# Patient Record
Sex: FEMALE | Race: ASIAN | NOT HISPANIC OR LATINO
[De-identification: names, ages, dates, MRNs, and addresses within clinical notes are randomized per-mention and may not be internally consistent; named-entity substitution may affect disease eponyms.]

---

## 2021-04-26 ENCOUNTER — APPOINTMENT (OUTPATIENT)
Dept: CARDIOLOGY | Facility: CLINIC | Age: 86
End: 2021-04-26
Payer: MEDICARE

## 2021-04-26 VITALS
WEIGHT: 138 LBS | RESPIRATION RATE: 17 BRPM | OXYGEN SATURATION: 97 % | BODY MASS INDEX: 24.45 KG/M2 | SYSTOLIC BLOOD PRESSURE: 151 MMHG | TEMPERATURE: 98.6 F | HEART RATE: 75 BPM | HEIGHT: 63 IN | DIASTOLIC BLOOD PRESSURE: 78 MMHG

## 2021-04-26 DIAGNOSIS — R55 SYNCOPE AND COLLAPSE: ICD-10-CM

## 2021-04-26 PROBLEM — Z00.00 ENCOUNTER FOR PREVENTIVE HEALTH EXAMINATION: Status: ACTIVE | Noted: 2021-04-26

## 2021-04-26 PROCEDURE — 99072 ADDL SUPL MATRL&STAF TM PHE: CPT

## 2021-04-26 PROCEDURE — 99204 OFFICE O/P NEW MOD 45 MIN: CPT

## 2021-04-26 PROCEDURE — 93306 TTE W/DOPPLER COMPLETE: CPT

## 2021-04-26 RX ORDER — NITROGLYCERIN 0.4 MG/1
0.4 TABLET SUBLINGUAL
Qty: 1 | Refills: 0 | Status: ACTIVE | COMMUNITY
Start: 2021-04-26 | End: 1900-01-01

## 2021-04-30 ENCOUNTER — NON-APPOINTMENT (OUTPATIENT)
Age: 86
End: 2021-04-30

## 2021-05-04 ENCOUNTER — APPOINTMENT (OUTPATIENT)
Dept: CARDIOLOGY | Facility: CLINIC | Age: 86
End: 2021-05-04
Payer: MEDICARE

## 2021-05-04 PROCEDURE — 99072 ADDL SUPL MATRL&STAF TM PHE: CPT

## 2021-05-04 PROCEDURE — A9500: CPT

## 2021-05-04 PROCEDURE — 78452 HT MUSCLE IMAGE SPECT MULT: CPT

## 2021-05-04 PROCEDURE — 93015 CV STRESS TEST SUPVJ I&R: CPT

## 2021-05-05 ENCOUNTER — NON-APPOINTMENT (OUTPATIENT)
Age: 86
End: 2021-05-05

## 2022-09-06 NOTE — HISTORY OF PRESENT ILLNESS
[FreeTextEntry1] : 86 year-old female with HTN presents for evaluation of CP.  Patient reports 2 episodes of  L sided CP that radiates back to L back, not tender, lasting minutes. Patient would feel weak with diaphoresis.  Patient reports SOB with exertion.  Patient denies palpitations.  She reports an episode of syncope last fall as she was just standing and talking.  She reports vertigo occasionally.  I advised patient to undergo an echocardiogram. I advised patient to wear a Holter monitor. I advised patient to undergo nuclear stress test.  Patient underwent an echocardiogram and it showed normal LV function without significant valvular pathology.  Patient underwent a pharmacologic nuclear stress test and it showed normal myocardial perfusion.  Patient wore a Holter and it showed frequent APC's (7.5 %), occasional PVC's , and short runs of PAT (10 beats the longest).  I will consider Metoprolol ER 25 mg if she becomes symptomatic.

## 2022-09-09 ENCOUNTER — APPOINTMENT (OUTPATIENT)
Dept: CARDIOLOGY | Facility: CLINIC | Age: 87
End: 2022-09-09
Payer: MEDICARE

## 2022-09-09 ENCOUNTER — NON-APPOINTMENT (OUTPATIENT)
Age: 87
End: 2022-09-09

## 2022-09-09 VITALS
RESPIRATION RATE: 17 BRPM | OXYGEN SATURATION: 98 % | TEMPERATURE: 97.8 F | HEART RATE: 64 BPM | DIASTOLIC BLOOD PRESSURE: 92 MMHG | WEIGHT: 133 LBS | SYSTOLIC BLOOD PRESSURE: 167 MMHG | BODY MASS INDEX: 23.56 KG/M2

## 2022-09-09 DIAGNOSIS — R07.9 CHEST PAIN, UNSPECIFIED: ICD-10-CM

## 2022-09-09 DIAGNOSIS — I10 ESSENTIAL (PRIMARY) HYPERTENSION: ICD-10-CM

## 2022-09-09 PROCEDURE — 99214 OFFICE O/P EST MOD 30 MIN: CPT | Mod: 25

## 2022-09-09 PROCEDURE — 93000 ELECTROCARDIOGRAM COMPLETE: CPT

## 2022-09-09 RX ORDER — ROPINIROLE 0.25 MG/1
0.25 TABLET, FILM COATED ORAL DAILY
Qty: 30 | Refills: 0 | Status: ACTIVE | COMMUNITY
Start: 2022-09-09

## 2022-09-09 RX ORDER — LISINOPRIL AND HYDROCHLOROTHIAZIDE TABLETS 10; 12.5 MG/1; MG/1
10-12.5 TABLET ORAL DAILY
Qty: 90 | Refills: 0 | Status: ACTIVE | COMMUNITY
Start: 2022-09-09

## 2022-09-09 RX ORDER — NIFEDIPINE 30 MG/1
30 TABLET, EXTENDED RELEASE ORAL DAILY
Qty: 30 | Refills: 0 | Status: ACTIVE | COMMUNITY
Start: 2022-09-09

## 2022-09-09 RX ORDER — METOPROLOL SUCCINATE 25 MG/1
25 TABLET, EXTENDED RELEASE ORAL
Qty: 60 | Refills: 1 | Status: ACTIVE | COMMUNITY
Start: 2022-09-09 | End: 1900-01-01

## 2022-09-12 PROBLEM — R07.9 CHEST PAIN: Status: ACTIVE | Noted: 2022-09-12

## 2022-09-23 ENCOUNTER — APPOINTMENT (OUTPATIENT)
Dept: CARDIOLOGY | Facility: CLINIC | Age: 87
End: 2022-09-23
Payer: MEDICARE

## 2022-09-23 VITALS — DIASTOLIC BLOOD PRESSURE: 78 MMHG | SYSTOLIC BLOOD PRESSURE: 132 MMHG | TEMPERATURE: 97.8 F

## 2022-09-23 DIAGNOSIS — R07.89 OTHER CHEST PAIN: ICD-10-CM

## 2022-09-23 PROCEDURE — A9500: CPT

## 2022-09-23 PROCEDURE — 78452 HT MUSCLE IMAGE SPECT MULT: CPT | Mod: 26

## 2022-09-23 PROCEDURE — 93306 TTE W/DOPPLER COMPLETE: CPT | Mod: 26

## 2022-09-23 NOTE — REASON FOR VISIT
[Symptom and Test Evaluation] : symptom and test evaluation [FreeTextEntry1] : 87 year-old female with HTN who presents for evaluation of chest pain.\par \par Last seen by Dr. Leggett 4/26/21:  I advised patient to undergo an echocardiogram. I advised patient to wear a Holter monitor. I advised patient to undergo nuclear stress test. Patient underwent an echocardiogram and it showed normal LV function without significant valvular pathology. Patient underwent a pharmacologic nuclear stress test and it showed normal myocardial perfusion. Patient wore a Holter and it showed frequent APC's (7.5 %), occasional PVC's , and short runs of PAT (10 beats the longest). I will consider Metoprolol ER 25 mg if she becomes symptomatic. \par \par She is on ASA 81 for cardioprotection. She is on Lisinopril-HCTZ 10-12.5, nifedipine 30 for HTN. She has been taking metoprolol succinate 25mg once daily.

## 2022-09-23 NOTE — REVIEW OF SYSTEMS
[Vertigo] : vertigo [SOB] : shortness of breath [Chest Discomfort] : chest discomfort [Palpitations] : palpitations [Negative] : Heme/Lymph [Lower Ext Edema] : no extremity edema [Leg Claudication] : no intermittent leg claudication [Orthopnea] : no orthopnea [PND] : no PND [Syncope] : no syncope

## 2022-09-23 NOTE — ASSESSMENT
[FreeTextEntry1] : 87 year-old female with HTN who presents for evaluation of chest pain.\par \par Last seen by Dr. Leggett 4/26/21: I advised patient to undergo an echocardiogram. I advised patient to wear a Holter monitor. I advised patient to undergo nuclear stress test. Patient underwent an echocardiogram and it showed normal LV function without significant valvular pathology. Patient underwent a pharmacologic nuclear stress test and it showed normal myocardial perfusion. Patient wore a Holter and it showed frequent APC's (7.5 %), occasional PVC's , and short runs of PAT (10 beats the longest). I will consider Metoprolol ER 25 mg if she becomes symptomatic. \par \par She is on ASA 81 for cardioprotection. She is on Lisinopril-HCTZ 10-12.5, nifedipine 30 for HTN. She has been taking metoprolol succinate 25mg once daily. \par \par 1) Chest pain, some atypical features\par - I advised patient to repeat pharmacologic nuclear stress test to r/o ischemia --> pharm stress done 9/23/22 which showed normal myocardial perfusion (similar to 5/2021 study)\par - I advised repeat TTE to assess LV function --> TTE done 9/23/22 showing normal LV and RV function, mild-mod TR and normal pulmonary pressures. \par - will increase metoprolol as below\par \par 2) palpitations,\par - I advised increasing metoprolol succinate from 25mg daily to twice daily\par \par 3) Follow-up with Dr. Leggett, if persistent symptoms

## 2022-09-23 NOTE — HISTORY OF PRESENT ILLNESS
[FreeTextEntry1] : Patient states that she has been doing okay for the past year but noticed that her chest pain has been more frequent and worsening since last year. States that she has L sided chest pain that goes to her back with associated shortness of breath. She states her symptoms sometimes feel worse with exertion. She reports feeling skipped beats. She states she has been having vertigo-like symptoms since she was in her 30s. She did not take her BP meds this morning.\par \par 4/26/21- 87 year-old female with HTN presents for evaluation of CP. Patient reports 2 episodes of L sided CP that radiates back to L back, not tender, lasting minutes. Patient would feel weak with diaphoresis. Patient reports SOB with exertion. Patient denies palpitations. She reports an episode of syncope last fall as she was just standing and talking. She reports vertigo occasionally. I advised patient to undergo an echocardiogram. I advised patient to wear a Holter monitor. I advised patient to undergo nuclear stress test. Patient underwent an echocardiogram and it showed normal LV function without significant valvular pathology. Patient underwent a pharmacologic nuclear stress test and it showed normal myocardial perfusion. Patient wore a Holter and it showed frequent APC's (7.5 %), occasional PVC's , and short runs of PAT (10 beats the longest). I will consider Metoprolol ER 25 mg if she becomes symptomatic.

## 2023-01-11 RX ORDER — ASPIRIN ENTERIC COATED TABLETS 81 MG 81 MG/1
81 TABLET, DELAYED RELEASE ORAL DAILY
Qty: 30 | Refills: 5 | Status: ACTIVE | COMMUNITY
Start: 2021-04-26 | End: 1900-01-01

## 2023-06-01 ENCOUNTER — NON-APPOINTMENT (OUTPATIENT)
Age: 88
End: 2023-06-01

## 2023-06-01 ENCOUNTER — LABORATORY RESULT (OUTPATIENT)
Age: 88
End: 2023-06-01

## 2023-06-01 ENCOUNTER — APPOINTMENT (OUTPATIENT)
Dept: CARDIOLOGY | Facility: CLINIC | Age: 88
End: 2023-06-01
Payer: MEDICARE

## 2023-06-01 VITALS
RESPIRATION RATE: 18 BRPM | OXYGEN SATURATION: 97 % | SYSTOLIC BLOOD PRESSURE: 137 MMHG | DIASTOLIC BLOOD PRESSURE: 74 MMHG | TEMPERATURE: 97.7 F | WEIGHT: 130 LBS | HEART RATE: 74 BPM | BODY MASS INDEX: 23.03 KG/M2

## 2023-06-01 DIAGNOSIS — R00.2 PALPITATIONS: ICD-10-CM

## 2023-06-01 PROCEDURE — 99214 OFFICE O/P EST MOD 30 MIN: CPT | Mod: 25

## 2023-06-01 PROCEDURE — 93000 ELECTROCARDIOGRAM COMPLETE: CPT

## 2023-06-01 NOTE — REASON FOR VISIT
[FreeTextEntry1] : 87 year-old female with HTN who presents for evaluation of chest pain.\par \par Last seen 9/9/22 with Dr. Thomas - Patient states that she has been doing okay for the past year but noticed that her chest pain has been more frequent and worsening since last year. States that she has L sided chest pain that goes to her back with associated shortness of breath. She states her symptoms sometimes feel worse with exertion. She reports feeling skipped beats. She states she has been having vertigo-like symptoms since she was in her 30s. She did not take her BP meds this morning.  Patient underwent a pharmacologic nuclear stress test and it showed normal myocardial perfusion.  Patient underwent an echocardiogram and it showed normal LV function without significant valvular pathology.  Patient was advised to increase Metoprolol ER 25 mg to BID.\par \par She is on ASA 81 for cardioprotection. She is on Lisinopril-HCTZ 10-12.5, nifedipine 30 for HTN. She has been taking metoprolol succinate 25 mg BID.\par \par Patient underwent an echocardiogram 4/26/21 and it showed normal LV function without significant valvular pathology. \par \par Patient underwent a pharmacologic nuclear stress test 5/4/21 and it showed normal myocardial perfusion. \par Patient wore a Holter 4/26/21 and it showed frequent APC's (7.5 %), occasional PVC's , and short runs of PAT (10 beats the longest). I will consider Metoprolol ER 25 mg if she becomes symptomatic.

## 2023-06-01 NOTE — HISTORY OF PRESENT ILLNESS
[FreeTextEntry1] : 6/1/23 - Pt reports palpitations w. back pain and SOB w. normal speed walking. Two months ago, she experienced palpitations w. SOB and weakness lasting 10 minutes when she came back from outside. She is on Metoprolol ER 25 mg BID. Her BP is around 140 at home.  I advised patient to wear a 7-day event monitor to r/o PAF.  I advised patient to have a CXR to rule out lung pathology.  I will check BT for ProBNP.\par \par 9/9/22 with Dr. Thomas - Patient states that she has been doing okay for the past year but noticed that her chest pain has been more frequent and worsening since last year. States that she has L sided chest pain that goes to her back with associated shortness of breath. She states her symptoms sometimes feel worse with exertion. She reports feeling skipped beats. She states she has been having vertigo-like symptoms since she was in her 30s. She did not take her BP meds this morning.  Patient underwent a pharmacologic nuclear stress test and it showed normal myocardial perfusion.  Patient underwent an echocardiogram and it showed normal LV function without significant valvular pathology.  Patient was advised to increase Metoprolol ER 25 mg to BID.\par \par 4/26/21- 87 year-old female with HTN presents for evaluation of CP. Patient reports 2 episodes of L sided CP that radiates back to L back, not tender, lasting minutes. Patient would feel weak with diaphoresis. Patient reports SOB with exertion. Patient denies palpitations. She reports an episode of syncope last fall as she was just standing and talking. She reports vertigo occasionally. I advised patient to undergo an echocardiogram. I advised patient to wear a Holter monitor. I advised patient to undergo nuclear stress test. Patient underwent an echocardiogram and it showed normal LV function without significant valvular pathology. Patient underwent a pharmacologic nuclear stress test and it showed normal myocardial perfusion. Patient wore a Holter and it showed frequent APC's (7.5 %), occasional PVC's , and short runs of PAT (10 beats the longest). I will consider Metoprolol ER 25 mg if she becomes symptomatic.

## 2023-06-05 LAB
ALBUMIN SERPL ELPH-MCNC: 3.9 G/DL
ALP BLD-CCNC: 51 U/L
ALT SERPL-CCNC: 15 U/L
ANION GAP SERPL CALC-SCNC: 10 MMOL/L
AST SERPL-CCNC: 19 U/L
BILIRUB SERPL-MCNC: 0.4 MG/DL
BUN SERPL-MCNC: 15 MG/DL
CALCIUM SERPL-MCNC: 9.4 MG/DL
CHLORIDE SERPL-SCNC: 105 MMOL/L
CO2 SERPL-SCNC: 29 MMOL/L
CREAT SERPL-MCNC: 0.69 MG/DL
EGFR: 83 ML/MIN/1.73M2
GLUCOSE SERPL-MCNC: 105 MG/DL
NT-PROBNP SERPL-MCNC: 499 PG/ML
POTASSIUM SERPL-SCNC: 4.5 MMOL/L
PROT SERPL-MCNC: 6.4 G/DL
SODIUM SERPL-SCNC: 143 MMOL/L

## 2023-07-12 ENCOUNTER — RX RENEWAL (OUTPATIENT)
Age: 88
End: 2023-07-12

## 2023-07-12 RX ORDER — ASPIRIN 81 MG/1
81 TABLET, COATED ORAL
Qty: 30 | Refills: 5 | Status: ACTIVE | COMMUNITY
Start: 2023-07-12 | End: 1900-01-01

## 2024-01-24 ENCOUNTER — APPOINTMENT (OUTPATIENT)
Dept: CARDIOLOGY | Facility: CLINIC | Age: 89
End: 2024-01-24
Payer: MEDICARE

## 2024-01-24 VITALS
TEMPERATURE: 96.8 F | BODY MASS INDEX: 21.97 KG/M2 | OXYGEN SATURATION: 97 % | HEART RATE: 59 BPM | WEIGHT: 124 LBS | SYSTOLIC BLOOD PRESSURE: 127 MMHG | DIASTOLIC BLOOD PRESSURE: 77 MMHG | RESPIRATION RATE: 18 BRPM

## 2024-01-24 DIAGNOSIS — R06.02 SHORTNESS OF BREATH: ICD-10-CM

## 2024-01-24 DIAGNOSIS — I50.30 UNSPECIFIED DIASTOLIC (CONGESTIVE) HEART FAILURE: ICD-10-CM

## 2024-01-24 PROCEDURE — 99214 OFFICE O/P EST MOD 30 MIN: CPT

## 2024-01-24 PROCEDURE — 93306 TTE W/DOPPLER COMPLETE: CPT

## 2024-01-24 RX ORDER — EMPAGLIFLOZIN 10 MG/1
10 TABLET, FILM COATED ORAL
Qty: 30 | Refills: 5 | Status: ACTIVE | COMMUNITY
Start: 2024-01-24 | End: 1900-01-01

## 2024-01-30 NOTE — HISTORY OF PRESENT ILLNESS
[FreeTextEntry1] : 1/24/24- Patient reports having covid 2 weeks ago with weakness. Patient denies fever or cough. Patient reports SOB with exertion and palpitations lasting seconds. Patient denies CP. BP is 155-145-135. I advised patient to undergo an echocardiogram.  I advised patient to start on Jardiance 10 mg.   6/1/23 - Pt reports palpitations w. back pain and SOB w. normal speed walking. Two months ago, she experienced palpitations w. SOB and weakness lasting 10 minutes when she came back from outside. She is on Metoprolol ER 25 mg BID. Her BP is around 140 at home.  I advised patient to wear a 7-day event monitor to r/o PAF.  I advised patient to have a CXR to rule out lung pathology.  I will check BT for ProBNP (499).  Chest CTA showed no PE.  Patient wore a 7-day monitor and it showed average HR 63, rare PVCs, occasional PACs, couplets, triplets, and 79 PATs, the fastest 4 beats at a rate of 150, the longest 20 beats at a rate of 94. No A. Fib.  9/9/22 with Dr. Thomas - Patient states that she has been doing okay for the past year but noticed that her chest pain has been more frequent and worsening since last year. States that she has L sided chest pain that goes to her back with associated shortness of breath. She states her symptoms sometimes feel worse with exertion. She reports feeling skipped beats. She states she has been having vertigo-like symptoms since she was in her 30s. She did not take her BP meds this morning.  Patient underwent a pharmacologic nuclear stress test and it showed normal myocardial perfusion.  Patient underwent an echocardiogram and it showed normal LV function without significant valvular pathology.  Patient was advised to increase Metoprolol ER 25 mg to BID.  4/26/21- 87 year-old female with HTN presents for evaluation of CP. Patient reports 2 episodes of L sided CP that radiates back to L back, not tender, lasting minutes. Patient would feel weak with diaphoresis. Patient reports SOB with exertion. Patient denies palpitations. She reports an episode of syncope last fall as she was just standing and talking. She reports vertigo occasionally. I advised patient to undergo an echocardiogram. I advised patient to wear a Holter monitor. I advised patient to undergo nuclear stress test. Patient underwent an echocardiogram and it showed normal LV function without significant valvular pathology. Patient underwent a pharmacologic nuclear stress test and it showed normal myocardial perfusion. Patient wore a Holter and it showed frequent APC's (7.5 %), occasional PVC's , and short runs of PAT (10 beats the longest). I will consider Metoprolol ER 25 mg if she becomes symptomatic.

## 2024-01-30 NOTE — REASON FOR VISIT
[FreeTextEntry1] : 89 year-old female with HTN who presents for evaluation of chest pain.  Last seen 6/1/23 - Pt reports palpitations w. back pain and SOB w. normal speed walking. Two months ago, she experienced palpitations w. SOB and weakness lasting 10 minutes when she came back from outside. She is on Metoprolol ER 25 mg BID. Her BP is around 140 at home.  I advised patient to wear a 7-day event monitor to r/o PAF.  I advised patient to have a CXR to rule out lung pathology.  I will check BT for ProBNP (499).  Chest CTA showed no PE.  Patient wore a 7-day monitor and it showed average HR 63, rare PVCs, occasional PACs, couplets, triplets, and 79 PATs, the fastest 4 beats at a rate of 150, the longest 20 beats at a rate of 94. No A. Fib.  She is on ASA 81 for cardioprotection. She is on Lisinopril-HCTZ 10-12.5, Nifedipine 30 for HTN. She is on Metoprolol succinate 25 mg BID.  Patient underwent a pharmacologic nuclear stress test 9/23/22 and it showed normal myocardial perfusion.    Patient underwent an echocardiogram 9/23/22 and it showed normal LV function without significant valvular pathology.  Patient was advised to increase Metoprolol ER 25 mg to BID.  Patient underwent an echocardiogram 4/26/21 and it showed normal LV function without significant valvular pathology.   Patient underwent a pharmacologic nuclear stress test 5/4/21 and it showed normal myocardial perfusion.  Patient wore a Holter 4/26/21 and it showed frequent APC's (7.5 %), occasional PVC's , and short runs of PAT (10 beats the longest). I will consider Metoprolol ER 25 mg if she becomes symptomatic.